# Patient Record
Sex: FEMALE | Race: OTHER | NOT HISPANIC OR LATINO
[De-identification: names, ages, dates, MRNs, and addresses within clinical notes are randomized per-mention and may not be internally consistent; named-entity substitution may affect disease eponyms.]

---

## 2018-04-18 ENCOUNTER — APPOINTMENT (OUTPATIENT)
Dept: ANTEPARTUM | Facility: CLINIC | Age: 33
End: 2018-04-18

## 2018-04-18 PROBLEM — Z00.00 ENCOUNTER FOR PREVENTIVE HEALTH EXAMINATION: Status: ACTIVE | Noted: 2018-04-18

## 2018-08-06 ENCOUNTER — ASOB RESULT (OUTPATIENT)
Age: 33
End: 2018-08-06

## 2018-08-06 ENCOUNTER — APPOINTMENT (OUTPATIENT)
Dept: ANTEPARTUM | Facility: CLINIC | Age: 33
End: 2018-08-06
Payer: SELF-PAY

## 2018-08-06 PROCEDURE — 76857 US EXAM PELVIC LIMITED: CPT

## 2018-08-06 PROCEDURE — 76830 TRANSVAGINAL US NON-OB: CPT

## 2019-05-02 ENCOUNTER — EMERGENCY (EMERGENCY)
Facility: HOSPITAL | Age: 34
LOS: 1 days | Discharge: HOME | End: 2019-05-02
Attending: EMERGENCY MEDICINE
Payer: SELF-PAY

## 2019-05-02 VITALS
DIASTOLIC BLOOD PRESSURE: 82 MMHG | HEART RATE: 95 BPM | WEIGHT: 169.98 LBS | RESPIRATION RATE: 18 BRPM | TEMPERATURE: 98 F | OXYGEN SATURATION: 99 % | SYSTOLIC BLOOD PRESSURE: 145 MMHG

## 2019-05-02 DIAGNOSIS — Z98.891 HISTORY OF UTERINE SCAR FROM PREVIOUS SURGERY: Chronic | ICD-10-CM

## 2019-05-02 LAB
ALBUMIN SERPL ELPH-MCNC: 4.5 G/DL — SIGNIFICANT CHANGE UP (ref 3.3–5.2)
ALP SERPL-CCNC: 108 U/L — SIGNIFICANT CHANGE UP (ref 40–120)
ALT FLD-CCNC: 27 U/L — SIGNIFICANT CHANGE UP
ANION GAP SERPL CALC-SCNC: 12 MMOL/L — SIGNIFICANT CHANGE UP (ref 5–17)
APPEARANCE UR: CLEAR — SIGNIFICANT CHANGE UP
AST SERPL-CCNC: 23 U/L — SIGNIFICANT CHANGE UP
BILIRUB SERPL-MCNC: <0.2 MG/DL — LOW (ref 0.4–2)
BILIRUB UR-MCNC: NEGATIVE — SIGNIFICANT CHANGE UP
BUN SERPL-MCNC: 8 MG/DL — SIGNIFICANT CHANGE UP (ref 8–20)
CALCIUM SERPL-MCNC: 9.7 MG/DL — SIGNIFICANT CHANGE UP (ref 8.6–10.2)
CHLORIDE SERPL-SCNC: 98 MMOL/L — SIGNIFICANT CHANGE UP (ref 98–107)
CO2 SERPL-SCNC: 28 MMOL/L — SIGNIFICANT CHANGE UP (ref 22–29)
COLOR SPEC: YELLOW — SIGNIFICANT CHANGE UP
CREAT SERPL-MCNC: 0.58 MG/DL — SIGNIFICANT CHANGE UP (ref 0.5–1.3)
DIFF PNL FLD: NEGATIVE — SIGNIFICANT CHANGE UP
GLUCOSE SERPL-MCNC: 89 MG/DL — SIGNIFICANT CHANGE UP (ref 70–115)
GLUCOSE UR QL: NEGATIVE MG/DL — SIGNIFICANT CHANGE UP
HCG SERPL-ACNC: <4 MIU/ML — SIGNIFICANT CHANGE UP
HCT VFR BLD CALC: 36.6 % — LOW (ref 37–47)
HGB BLD-MCNC: 11.2 G/DL — LOW (ref 12–16)
KETONES UR-MCNC: NEGATIVE — SIGNIFICANT CHANGE UP
LEUKOCYTE ESTERASE UR-ACNC: NEGATIVE — SIGNIFICANT CHANGE UP
MCHC RBC-ENTMCNC: 23.3 PG — LOW (ref 27–31)
MCHC RBC-ENTMCNC: 30.6 G/DL — LOW (ref 32–36)
MCV RBC AUTO: 76.1 FL — LOW (ref 81–99)
NITRITE UR-MCNC: NEGATIVE — SIGNIFICANT CHANGE UP
PH UR: 7 — SIGNIFICANT CHANGE UP (ref 5–8)
PLATELET # BLD AUTO: 401 K/UL — HIGH (ref 150–400)
POTASSIUM SERPL-MCNC: 3.2 MMOL/L — LOW (ref 3.5–5.3)
POTASSIUM SERPL-SCNC: 3.2 MMOL/L — LOW (ref 3.5–5.3)
PROT SERPL-MCNC: 7.8 G/DL — SIGNIFICANT CHANGE UP (ref 6.6–8.7)
PROT UR-MCNC: NEGATIVE MG/DL — SIGNIFICANT CHANGE UP
RBC # BLD: 4.81 M/UL — SIGNIFICANT CHANGE UP (ref 4.4–5.2)
RBC # FLD: 14.6 % — SIGNIFICANT CHANGE UP (ref 11–15.6)
SODIUM SERPL-SCNC: 138 MMOL/L — SIGNIFICANT CHANGE UP (ref 135–145)
SP GR SPEC: 1 — LOW (ref 1.01–1.02)
UROBILINOGEN FLD QL: NEGATIVE MG/DL — SIGNIFICANT CHANGE UP
WBC # BLD: 9.7 K/UL — SIGNIFICANT CHANGE UP (ref 4.8–10.8)
WBC # FLD AUTO: 9.7 K/UL — SIGNIFICANT CHANGE UP (ref 4.8–10.8)

## 2019-05-02 PROCEDURE — 87491 CHLMYD TRACH DNA AMP PROBE: CPT

## 2019-05-02 PROCEDURE — 85027 COMPLETE CBC AUTOMATED: CPT

## 2019-05-02 PROCEDURE — 84702 CHORIONIC GONADOTROPIN TEST: CPT

## 2019-05-02 PROCEDURE — 87070 CULTURE OTHR SPECIMN AEROBIC: CPT

## 2019-05-02 PROCEDURE — 80053 COMPREHEN METABOLIC PANEL: CPT

## 2019-05-02 PROCEDURE — 99284 EMERGENCY DEPT VISIT MOD MDM: CPT

## 2019-05-02 PROCEDURE — 87591 N.GONORRHOEAE DNA AMP PROB: CPT

## 2019-05-02 PROCEDURE — 81003 URINALYSIS AUTO W/O SCOPE: CPT

## 2019-05-02 PROCEDURE — 36415 COLL VENOUS BLD VENIPUNCTURE: CPT

## 2019-05-02 RX ORDER — FLUCONAZOLE 150 MG/1
1 TABLET ORAL
Qty: 1 | Refills: 0 | OUTPATIENT
Start: 2019-05-02

## 2019-05-02 RX ORDER — METRONIDAZOLE 500 MG
1 TABLET ORAL
Qty: 20 | Refills: 0 | OUTPATIENT
Start: 2019-05-02 | End: 2019-05-11

## 2019-05-02 RX ORDER — POTASSIUM CHLORIDE 20 MEQ
40 PACKET (EA) ORAL ONCE
Qty: 0 | Refills: 0 | Status: COMPLETED | OUTPATIENT
Start: 2019-05-02 | End: 2019-05-02

## 2019-05-02 RX ORDER — ACETAMINOPHEN 500 MG
650 TABLET ORAL ONCE
Qty: 0 | Refills: 0 | Status: COMPLETED | OUTPATIENT
Start: 2019-05-02 | End: 2019-05-02

## 2019-05-02 RX ADMIN — Medication 650 MILLIGRAM(S): at 22:00

## 2019-05-02 RX ADMIN — Medication 40 MILLIEQUIVALENT(S): at 21:59

## 2019-05-02 RX ADMIN — Medication 650 MILLIGRAM(S): at 20:37

## 2019-05-02 NOTE — ED PROVIDER NOTE - NS ED ROS FT
+ irregular vaginal bleeding, +white vaginal discharge, +mid lower abdominal pain and lower back pain

## 2019-05-02 NOTE — ED PROVIDER NOTE - PHYSICAL EXAMINATION
Pt hyper-sensitive to palpation during physical exam, moaning and jumping with abdominal palpation as well as palpation of the lower back. Vaginal exam limited as pt uncooperative, unable to fully visualize cervix however inner vaginal wall erythematous and thick white / yellow milky discharge noted

## 2019-05-02 NOTE — ED PROVIDER NOTE - CARE PLAN
Principal Discharge DX:	Vulvovaginitis  Assessment and plan of treatment:	continue with medication as prescribed

## 2019-05-02 NOTE — ED PROVIDER NOTE - PROGRESS NOTE DETAILS
Patient ambulating halls, eating and drinking. Reports persistent pain, US ordered to r/o ovarian torsion. Mild hypokalemia noted, oral replacement given. Awaiting US MIGUELITO Moya: received sign out from MIGUELITO Borjas. Pt would not like to stay for ultrasound at this time, reports pain has improved. Spoke to patient about risks/benefits of leaving without ultrasound. The patient is alert and oriented x4 and asking to leave.  The patient does not want to be admitted  and understands the risks of leaving including permanent disability and death.  The risks, benefits, hazard, alternatives and precautions were reviewed with the patient and the patient voices understanding. The patient is aware that he/she  should return at any time and that close follow-up with the PMD is recommended ASAP. MIGUELITO Moya: received sign out from MIGUELITO Borjas. Pt would not like to stay for ultrasound at this time, reports pain has improved. Spoke to patient about risks/benefits of leaving without ultrasound. The patient is alert and oriented x4 and asking to leave. The patient does not want ultrasound and understands the risks of leaving.  The risks, benefits, hazard, alternatives and precautions were reviewed with the patient and the patient voices understanding. The patient is aware that he/she should return at any time and that close follow-up with the PMD is recommended ASAP. Pt given follow up at [facility] clinic and instructed to follow up for outpatient ultrasound.

## 2019-05-02 NOTE — ED PROVIDER NOTE - OBJECTIVE STATEMENT
34 y/o F with hx HTN and hypothyroidism presents to ED with c/o abdominal pain, frequent urination, and irregular menses. Patient reports hx of heavy vaginal bleeding with clots but recently in the last 6 months her periods have become irregular and she missed periods. Pt was placed on birth control in February by her GYN. Pt states she had a normal period in February, no period in March and her LMP was 4/15 which lasted 3 days and she had minimal dark brown bleeding that menses. Patient reports lower abdominal pain and back pain worsening over the last week. Denies fever, chills, nausea, vomiting, diarrhea, constipation. Denies burning on urination, foul odor to urine, hematuria, and cloudy urine. Reports white vaginal discharge and darkening to her perivaginal/groin skin. Pt also reports dizziness but states she has a "head cold" for which she is taking a medication from [facility] called SHIRLEY-1.  At its worst pain is an 8-9 / 10 and at its best pain is a 4-5/10. Patient has not taken any meds at home for pain relief.

## 2019-05-02 NOTE — ED PROVIDER NOTE - SHIFT CHANGE DETAILS
awaiting US, re-evaluate and correlate with US results   If US normal d/c home with follow up to [facility] clinic / GYN  diflucan and metronidazole sent to pharmacy for vaginitis

## 2019-05-02 NOTE — ED PROVIDER NOTE - CLINICAL SUMMARY MEDICAL DECISION MAKING FREE TEXT BOX
pt presented with abdominal pain, frequent urination, and irregular menses. Labs, pelvic exam, US. Re-Evaluate.

## 2019-05-04 LAB
C TRACH RRNA SPEC QL NAA+PROBE: SIGNIFICANT CHANGE UP
CULTURE RESULTS: SIGNIFICANT CHANGE UP
N GONORRHOEA RRNA SPEC QL NAA+PROBE: SIGNIFICANT CHANGE UP
SPECIMEN SOURCE: SIGNIFICANT CHANGE UP
SPECIMEN SOURCE: SIGNIFICANT CHANGE UP

## 2021-10-14 ENCOUNTER — APPOINTMENT (OUTPATIENT)
Dept: INTERNAL MEDICINE | Facility: CLINIC | Age: 36
End: 2021-10-14

## 2024-03-14 NOTE — ED ADULT TRIAGE NOTE - CADM TRG TX PRIOR TO ARRIVAL
none Appearance: casual grooming, no overt deficits in hygiene; has multiple crosses on the floor made from paper towels; mild tremulousness in UE  Behavior: cantankerous, dominates conversation, oddly related  Speech: wnl  Mood: irritable  Affect: congruent, stable, somewhat intense  Thought process: perseverative, overinclusive;   Thought content: mistrustfulness noted; denies SI/HI  Perceptual disturbances: no appearance of internal preoccupation  Cognition: adequately oriented  Insight: limited  Judgement: limited